# Patient Record
Sex: MALE | Race: BLACK OR AFRICAN AMERICAN | NOT HISPANIC OR LATINO | Employment: FULL TIME | URBAN - METROPOLITAN AREA
[De-identification: names, ages, dates, MRNs, and addresses within clinical notes are randomized per-mention and may not be internally consistent; named-entity substitution may affect disease eponyms.]

---

## 2019-10-30 ENCOUNTER — TELEPHONE (OUTPATIENT)
Dept: INTERNAL MEDICINE | Facility: CLINIC | Age: 54
End: 2019-10-30

## 2019-10-30 DIAGNOSIS — E11.9 TYPE 2 DIABETES MELLITUS WITHOUT COMPLICATION, WITH LONG-TERM CURRENT USE OF INSULIN (HCC): ICD-10-CM

## 2019-10-30 DIAGNOSIS — Z00.00 WELLNESS EXAMINATION: Primary | ICD-10-CM

## 2019-10-30 DIAGNOSIS — Z79.4 TYPE 2 DIABETES MELLITUS WITHOUT COMPLICATION, WITH LONG-TERM CURRENT USE OF INSULIN (HCC): ICD-10-CM

## 2019-11-05 LAB — HBA1C MFR BLD: 11.3 % (ref 4.8–5.6)

## 2019-11-06 LAB
ALBUMIN SERPL-MCNC: 4.6 G/DL (ref 3.5–5.2)
ALBUMIN/GLOB SERPL: 1.8 G/DL
ALP SERPL-CCNC: 88 U/L (ref 39–117)
ALT SERPL-CCNC: 27 U/L (ref 1–41)
AST SERPL-CCNC: 17 U/L (ref 1–40)
BILIRUB SERPL-MCNC: 0.5 MG/DL (ref 0.2–1.2)
BUN SERPL-MCNC: 12 MG/DL (ref 6–20)
BUN/CREAT SERPL: 12.9 (ref 7–25)
CALCIUM SERPL-MCNC: 9.1 MG/DL (ref 8.6–10.5)
CHLORIDE SERPL-SCNC: 100 MMOL/L (ref 98–107)
CHOLEST SERPL-MCNC: 206 MG/DL (ref 0–200)
CHOLEST/HDLC SERPL: 5.28 {RATIO}
CO2 SERPL-SCNC: 27.9 MMOL/L (ref 22–29)
CREAT SERPL-MCNC: 0.93 MG/DL (ref 0.76–1.27)
GLOBULIN SER CALC-MCNC: 2.5 GM/DL
GLUCOSE SERPL-MCNC: 289 MG/DL (ref 65–99)
HDLC SERPL-MCNC: 39 MG/DL (ref 40–60)
LDLC SERPL CALC-MCNC: 149 MG/DL (ref 0–100)
POTASSIUM SERPL-SCNC: 4.5 MMOL/L (ref 3.5–5.2)
PROT SERPL-MCNC: 7.1 G/DL (ref 6–8.5)
PSA SERPL-MCNC: 0.89 NG/ML (ref 0–4)
SODIUM SERPL-SCNC: 140 MMOL/L (ref 136–145)
TRIGL SERPL-MCNC: 88 MG/DL (ref 0–150)
VLDLC SERPL CALC-MCNC: 17.6 MG/DL

## 2019-11-11 ENCOUNTER — OFFICE VISIT (OUTPATIENT)
Dept: INTERNAL MEDICINE | Facility: CLINIC | Age: 54
End: 2019-11-11

## 2019-11-11 VITALS
WEIGHT: 179.2 LBS | BODY MASS INDEX: 24.27 KG/M2 | DIASTOLIC BLOOD PRESSURE: 78 MMHG | SYSTOLIC BLOOD PRESSURE: 118 MMHG | HEIGHT: 72 IN

## 2019-11-11 DIAGNOSIS — B35.3 TINEA PEDIS OF BOTH FEET: ICD-10-CM

## 2019-11-11 DIAGNOSIS — E11.9 TYPE 2 DIABETES MELLITUS WITHOUT COMPLICATION, WITHOUT LONG-TERM CURRENT USE OF INSULIN (HCC): ICD-10-CM

## 2019-11-11 DIAGNOSIS — Z00.00 WELLNESS EXAMINATION: Primary | ICD-10-CM

## 2019-11-11 DIAGNOSIS — R42 DIZZINESS: ICD-10-CM

## 2019-11-11 DIAGNOSIS — E78.5 HYPERLIPIDEMIA, UNSPECIFIED HYPERLIPIDEMIA TYPE: ICD-10-CM

## 2019-11-11 PROCEDURE — 99396 PREV VISIT EST AGE 40-64: CPT | Performed by: INTERNAL MEDICINE

## 2019-11-11 RX ORDER — DOXAZOSIN 8 MG/1
8 TABLET ORAL NIGHTLY
COMMUNITY

## 2019-11-11 RX ORDER — ATORVASTATIN CALCIUM 20 MG/1
20 TABLET, FILM COATED ORAL DAILY
Qty: 30 TABLET | Refills: 2 | Status: SHIPPED | OUTPATIENT
Start: 2019-11-11

## 2019-11-11 RX ORDER — FLASH GLUCOSE SCANNING READER
1 EACH MISCELLANEOUS CONTINUOUS
Qty: 1 DEVICE | Refills: 5 | Status: SHIPPED | OUTPATIENT
Start: 2019-11-11

## 2019-11-11 RX ORDER — FLASH GLUCOSE SENSOR
1 KIT MISCELLANEOUS CONTINUOUS
Qty: 1 EACH | Refills: 5 | Status: SHIPPED | OUTPATIENT
Start: 2019-11-11

## 2019-11-11 NOTE — PROGRESS NOTES
Subjective        Chief Complaint   Patient presents with   • Annual Exam     yearly exam review labs c/o flash in head with dizziness x1 month            Taiwo Bernal is a 54 y.o. male who presents for    Patient Active Problem List   Diagnosis   • Wellness examination   • Diabetes mellitus (CMS/HCC)   • Hyperlipidemia   • Tinea pedis of both feet       History of Present Illness     He will get a flash in his head for couple seconds at a time 3-5 times per day. He is not sure of the trigger. It is worse with laying down. He denies slurred speech, blindness or headaches. It has been going on for two months. He can be dizzy with it for 2 seconds when it occurs. He checks his BP and it was 120/85 two weeks ago. He does not check his sugars; he does have the equipment. He does not exercise. He denies chest pain, dyspnea, melena or hematochezia. He has no problem urinating when he takes Doxazosin. He does not have excessive thirst.  Allergies   Allergen Reactions   • Ace Inhibitors Cough       Current Outpatient Medications on File Prior to Visit   Medication Sig Dispense Refill   • doxazosin (CARDURA) 8 MG tablet Take 8 mg by mouth Every Night.       No current facility-administered medications on file prior to visit.        Past Medical History:   Diagnosis Date   • BPH (benign prostatic hyperplasia)    • Diabetes mellitus (CMS/HCC)    • Hyperlipidemia    • Hypertension        Past Surgical History:   Procedure Laterality Date   • COLONOSCOPY  03/18/2018       Family History   Problem Relation Age of Onset   • Breast cancer Mother    • Diabetes Father    • Lung cancer Father        Social History     Socioeconomic History   • Marital status:      Spouse name: Not on file   • Number of children: Not on file   • Years of education: Not on file   • Highest education level: Not on file   Tobacco Use   • Smoking status: Never Smoker   • Smokeless tobacco: Never Used   Substance and Sexual Activity   • Alcohol  "use: No     Frequency: Never   • Drug use: No   • Sexual activity: Yes     Comment: current partner for 5 years           The following portions of the patient's history were reviewed and updated as appropriate: problem list, allergies, current medications, past medical history, past family history, past social history and past surgical history.    Review of Systems   Constitutional: Negative for chills, fever and unexpected weight loss.   HENT: Negative for postnasal drip and sore throat.    Eyes: Negative for blurred vision.   Respiratory: Negative for cough, shortness of breath and wheezing.    Cardiovascular: Negative for chest pain and leg swelling.   Gastrointestinal: Negative for abdominal pain, blood in stool, nausea, vomiting and GERD.   Endocrine: Negative for polyuria.   Genitourinary: Negative for dysuria, frequency and hematuria.   Musculoskeletal: Negative for gait problem.   Skin: Negative for rash.   Allergic/Immunologic: Negative for immunocompromised state.   Neurological: Positive for dizziness. Negative for weakness.   Hematological: Does not bruise/bleed easily.   Psychiatric/Behavioral: Negative for depressed mood. The patient is not nervous/anxious.        Immunization History   Administered Date(s) Administered   • Flu Vaccine Quad PF >18YRS 10/08/2018, 10/05/2019   • Hepatitis A 01/01/1995   • Hepatitis B 02/14/2018, 03/13/2018, 09/10/2018   • Pneumococcal Polysaccharide (PPSV23) 01/01/2012   • Tdap 02/01/2018       Objective   Vitals:    11/11/19 0843   BP: 118/78   Weight: 81.3 kg (179 lb 3.2 oz)   Height: 182.9 cm (72\")     Physical Exam   Constitutional: He appears well-developed and well-nourished.   HENT:   Head: Normocephalic and atraumatic.   Mouth/Throat: Oropharynx is clear and moist.   Eyes: Conjunctivae and EOM are normal. Pupils are equal, round, and reactive to light.   Neck: Neck supple. Carotid bruit is not present. No thyromegaly present.   Cardiovascular: Normal rate, " regular rhythm and normal heart sounds.   No murmur heard.  Pulmonary/Chest: Effort normal and breath sounds normal.   Abdominal: Soft. He exhibits no distension and no mass. There is no tenderness. There is no rebound.    Taiwo had a diabetic foot exam performed today.   During the foot exam he had a monofilament test performed.  Vascular Status -  His right foot exhibits no edema. His left foot exhibits no edema.  Skin Integrity  -  His right foot skin is intact.His left foot skin is intact..  Lymphadenopathy:     He has no cervical adenopathy.   Neurological: He is alert.   CN II-XII intact except did not check VIII    Scaling on feet.   Skin: Skin is warm.   Psychiatric: He has a normal mood and affect.   Vitals reviewed.      Procedures    Assessment/Plan   Taiwo was seen today for annual exam.    Diagnoses and all orders for this visit:    Wellness examination    Type 2 diabetes mellitus without complication, without long-term current use of insulin (CMS/[facility])  -     sitaGLIPtin-metFORMIN (JANUMET)  MG per tablet; Take 1 tablet by mouth 2 (Two) Times a Day With Meals.  -     Hemoglobin A1c; Future  -     Microalbumin / Creatinine Urine Ratio - Urine, Clean Catch; Future  -     C-Peptide; Future  -     Continuous Blood Gluc Sensor (FREESTYLE JULIO 14 DAY SENSOR) misc; 1 Device Continuous.  -     Continuous Blood Gluc  (FREESTYLE JULIO 14 DAY READER) device; 1 Device Continuous.    Hyperlipidemia, unspecified hyperlipidemia type  -     atorvastatin (LIPITOR) 20 MG tablet; Take 1 tablet by mouth Daily.  -     Comprehensive Metabolic Panel; Future  -     Lipid Panel With / Chol / HDL Ratio; Future    Tinea pedis of both feet    Dizziness  -     MRI Angiogram Head Without Contrast; Future               Reviewed labs. Sign for old records. Get cscope from [facility]. Discussed risk of blindness, MI, CVA, amputation, and ESRD with untreated DM. [facility] exercise. Gets prostate exam with Dr. Joshi. [facility]  OTC meds for tinea pedis. He wants to switch to Felicia to check sugars. I am going to get an MRA to make sure he does not have an aneurysm with the sensation that he has in his head.  Return in about 3 months (around 2/11/2020).

## 2020-02-04 DIAGNOSIS — E78.5 HYPERLIPIDEMIA, UNSPECIFIED HYPERLIPIDEMIA TYPE: ICD-10-CM

## 2020-02-04 DIAGNOSIS — E11.9 TYPE 2 DIABETES MELLITUS WITHOUT COMPLICATION, WITHOUT LONG-TERM CURRENT USE OF INSULIN (HCC): ICD-10-CM

## 2023-04-17 ENCOUNTER — OFFICE VISIT (OUTPATIENT)
Dept: INTERNAL MEDICINE | Facility: CLINIC | Age: 58
End: 2023-04-17
Payer: OTHER GOVERNMENT

## 2023-04-17 VITALS
WEIGHT: 171 LBS | BODY MASS INDEX: 23.16 KG/M2 | TEMPERATURE: 97.1 F | SYSTOLIC BLOOD PRESSURE: 140 MMHG | DIASTOLIC BLOOD PRESSURE: 80 MMHG | HEIGHT: 72 IN

## 2023-04-17 DIAGNOSIS — D36.9 TUBULAR ADENOMA: ICD-10-CM

## 2023-04-17 DIAGNOSIS — Z11.59 NEED FOR HEPATITIS C SCREENING TEST: ICD-10-CM

## 2023-04-17 DIAGNOSIS — Z00.00 WELLNESS EXAMINATION: ICD-10-CM

## 2023-04-17 DIAGNOSIS — E78.49 OTHER HYPERLIPIDEMIA: Chronic | ICD-10-CM

## 2023-04-17 DIAGNOSIS — E11.9 TYPE 2 DIABETES MELLITUS WITHOUT COMPLICATION, WITHOUT LONG-TERM CURRENT USE OF INSULIN: Primary | Chronic | ICD-10-CM

## 2023-04-17 NOTE — PROGRESS NOTES
Subjective        Chief Complaint   Patient presents with   • Diabetes   • Hypertension   • Annual Exam           Taiwo Bernal is a 57 y.o. male who presents for    Patient Active Problem List   Diagnosis   • Other hyperlipidemia   • Type 2 diabetes mellitus without complication, without long-term current use of insulin   • Tubular adenoma       History of Present Illness     He sees urology once per year for his prostate. He has not checked his BP or sugars. He denies urinary frequency, dysuria, abdominal pain, chest pain or dyspnea. He has lost 11 pounds over the last 2 years and that is why he came in. He walks 4-5 miles per day at work. He plays basketball once per week. He has been monomagous.  Allergies   Allergen Reactions   • Ace Inhibitors Cough       Current Outpatient Medications on File Prior to Visit   Medication Sig Dispense Refill   • doxazosin (CARDURA) 8 MG tablet Take 1 tablet by mouth Every Night.     • atorvastatin (LIPITOR) 20 MG tablet Take 1 tablet by mouth Daily. 30 tablet 2   • sitaGLIPtin-metFORMIN (JANUMET)  MG per tablet Take 1 tablet by mouth 2 (Two) Times a Day With Meals. 60 tablet 2   • [DISCONTINUED] Continuous Blood Gluc  (FREESTYLE JULIO 14 DAY READER) device 1 Device Continuous. (Patient not taking: Reported on 4/17/2023) 1 Device 5   • [DISCONTINUED] Continuous Blood Gluc Sensor (FREESTYLE JULIO 14 DAY SENSOR) misc 1 Device Continuous. (Patient not taking: Reported on 4/17/2023) 1 each 5     No current facility-administered medications on file prior to visit.       Past Medical History:   Diagnosis Date   • BPH (benign prostatic hyperplasia)        Past Surgical History:   Procedure Laterality Date   • COLONOSCOPY  03/27/2018    repeat in 5 years with Dr. Sexton       Family History   Problem Relation Age of Onset   • Breast cancer Mother    • Diabetes Father    • Lung cancer Father    • Colon polyps Brother        Social History     Socioeconomic History   •  "Marital status:    Tobacco Use   • Smoking status: Never   • Smokeless tobacco: Never   Substance and Sexual Activity   • Alcohol use: No   • Drug use: No   • Sexual activity: Yes     Comment: current partner for 5 years           The following portions of the patient's history were reviewed and updated as appropriate: problem list, allergies, current medications, past medical history, past family history, past social history and past surgical history.    Review of Systems   Constitutional: Positive for unexpected weight loss. Negative for chills and fever.   HENT: Negative for postnasal drip and sore throat.    Eyes: Negative for blurred vision.   Respiratory: Negative for cough, shortness of breath and wheezing.    Cardiovascular: Negative for chest pain and leg swelling.   Gastrointestinal: Negative for abdominal pain, blood in stool, nausea, vomiting and GERD.   Endocrine: Negative for polyuria.   Genitourinary: Negative for dysuria, frequency and hematuria.   Musculoskeletal: Negative for gait problem.   Skin: Negative for rash.   Allergic/Immunologic: Negative for immunocompromised state.   Neurological: Negative for weakness.   Hematological: Does not bruise/bleed easily.   Psychiatric/Behavioral: Negative for depressed mood. The patient is not nervous/anxious.        Immunization History   Administered Date(s) Administered   • COVID-19 (PFIZER) PURPLE CAP 03/11/2021, 04/08/2021, 10/31/2021   • Fluzone Quad >6mos (Multi-dose) 10/08/2018, 10/05/2019   • Hepatitis A 01/01/1995   • Hepatitis B Adult/Adolescent IM 02/14/2018, 03/13/2018, 09/10/2018   • Influenza, Unspecified 12/21/2022   • Pneumococcal Conjugate 20-Valent (PCV20) 04/17/2023   • Pneumococcal Polysaccharide (PPSV23) 01/01/2012   • Tdap 02/01/2018       Objective   Vitals:    04/17/23 1532   BP: 140/80   Temp: 97.1 °F (36.2 °C)   Weight: 77.6 kg (171 lb)   Height: 182.9 cm (72.01\")     Body mass index is 23.19 kg/m².  Physical Exam  Vitals " reviewed.   Constitutional:       Appearance: He is well-developed.   HENT:      Head: Normocephalic and atraumatic.      Mouth/Throat:      Mouth: Mucous membranes are moist.      Pharynx: Oropharynx is clear.   Eyes:      Extraocular Movements: Extraocular movements intact.      Conjunctiva/sclera: Conjunctivae normal.      Pupils: Pupils are equal, round, and reactive to light.   Neck:      Thyroid: No thyromegaly.      Vascular: No carotid bruit.   Cardiovascular:      Rate and Rhythm: Normal rate and regular rhythm.      Heart sounds: Normal heart sounds. No murmur heard.  Pulmonary:      Effort: Pulmonary effort is normal.      Breath sounds: Normal breath sounds.   Abdominal:      General: There is no distension.      Palpations: Abdomen is soft. There is no mass.      Tenderness: There is no abdominal tenderness. There is no rebound.   Musculoskeletal:      Cervical back: Neck supple.   Feet:      Right foot:      Protective Sensation: 5 sites tested. 5 sites sensed.      Skin integrity: Skin integrity normal.      Left foot:      Protective Sensation: 5 sites tested. 5 sites sensed.      Skin integrity: Skin integrity normal.   Lymphadenopathy:      Cervical: No cervical adenopathy.      Upper Body:      Right upper body: No supraclavicular, axillary or epitrochlear adenopathy.      Left upper body: No supraclavicular, axillary or epitrochlear adenopathy.   Skin:     General: Skin is warm.   Neurological:      Mental Status: He is alert.   Psychiatric:         Behavior: Behavior normal.         Procedures    Assessment & Plan   Diagnoses and all orders for this visit:    1. Type 2 diabetes mellitus without complication, without long-term current use of insulin (Primary)  -     Comprehensive Metabolic Panel  -     Hemoglobin A1c  -     Microalbumin / Creatinine Urine Ratio - Urine, Clean Catch  -     CBC & Differential  -     TSH  -     Urinalysis With Culture If Indicated -  -     Hemoglobin A1c;  Future    2. Other hyperlipidemia  -     Lipid Panel With / Chol / HDL Ratio  -     Comprehensive Metabolic Panel; Future  -     Lipid Panel With / Chol / HDL Ratio; Future    3. Need for hepatitis C screening test  -     Hepatitis C Antibody    4. Tubular adenoma  -     Ambulatory Referral For Screening Colonoscopy    5. Wellness examination    Other orders  -     Pneumococcal Conjugate Vaccine 20-Valent (PCV20)               Get labs. Discussed risks of uncontrolled DM. He will get me a copy of his eye exam. Repeat BP in follow up. Recc Shingrix. Recc 150 minutes of cardio per week. I think he most likely has lost wt secondary to uncontrolled DM; labs as above.  Return in about 3 months (around 7/17/2023).

## 2023-04-20 DIAGNOSIS — E78.5 HYPERLIPIDEMIA, UNSPECIFIED HYPERLIPIDEMIA TYPE: ICD-10-CM

## 2023-04-20 DIAGNOSIS — E11.9 TYPE 2 DIABETES MELLITUS WITHOUT COMPLICATION, WITHOUT LONG-TERM CURRENT USE OF INSULIN: ICD-10-CM

## 2023-04-20 LAB
ALBUMIN SERPL-MCNC: 4.3 G/DL (ref 3.5–5.2)
ALBUMIN/CREAT UR: 17 MG/G CREAT (ref 0–29)
ALBUMIN/GLOB SERPL: 1.7 G/DL
ALP SERPL-CCNC: 111 U/L (ref 39–117)
ALT SERPL-CCNC: 17 U/L (ref 1–41)
APPEARANCE UR: CLEAR
AST SERPL-CCNC: 16 U/L (ref 1–40)
BACTERIA #/AREA URNS HPF: NORMAL /HPF
BASOPHILS # BLD AUTO: 0.06 10*3/MM3 (ref 0–0.2)
BASOPHILS NFR BLD AUTO: 1.2 % (ref 0–1.5)
BILIRUB SERPL-MCNC: 0.4 MG/DL (ref 0–1.2)
BILIRUB UR QL STRIP: NEGATIVE
BUN SERPL-MCNC: 14 MG/DL (ref 6–20)
BUN/CREAT SERPL: 13.9 (ref 7–25)
CALCIUM SERPL-MCNC: 9.3 MG/DL (ref 8.6–10.5)
CASTS URNS QL MICRO: NORMAL /LPF
CHLORIDE SERPL-SCNC: 100 MMOL/L (ref 98–107)
CHOLEST SERPL-MCNC: 213 MG/DL (ref 0–200)
CHOLEST/HDLC SERPL: 4.95 {RATIO}
CO2 SERPL-SCNC: 26.9 MMOL/L (ref 22–29)
COLOR UR: YELLOW
CREAT SERPL-MCNC: 1.01 MG/DL (ref 0.76–1.27)
CREAT UR-MCNC: 137.7 MG/DL
EGFRCR SERPLBLD CKD-EPI 2021: 86.2 ML/MIN/1.73
EOSINOPHIL # BLD AUTO: 0.06 10*3/MM3 (ref 0–0.4)
EOSINOPHIL NFR BLD AUTO: 1.2 % (ref 0.3–6.2)
EPI CELLS #/AREA URNS HPF: NORMAL /HPF (ref 0–10)
ERYTHROCYTE [DISTWIDTH] IN BLOOD BY AUTOMATED COUNT: 12.6 % (ref 12.3–15.4)
GLOBULIN SER CALC-MCNC: 2.6 GM/DL
GLUCOSE SERPL-MCNC: 376 MG/DL (ref 65–99)
GLUCOSE UR QL STRIP: ABNORMAL
HBA1C MFR BLD: 12.5 % (ref 4.8–5.6)
HCT VFR BLD AUTO: 39.9 % (ref 37.5–51)
HCV IGG SERPL QL IA: NON REACTIVE
HDLC SERPL-MCNC: 43 MG/DL (ref 40–60)
HGB BLD-MCNC: 13.7 G/DL (ref 13–17.7)
HGB UR QL STRIP: NEGATIVE
IMM GRANULOCYTES # BLD AUTO: 0.01 10*3/MM3 (ref 0–0.05)
IMM GRANULOCYTES NFR BLD AUTO: 0.2 % (ref 0–0.5)
KETONES UR QL STRIP: NEGATIVE
LDLC SERPL CALC-MCNC: 155 MG/DL (ref 0–100)
LEUKOCYTE ESTERASE UR QL STRIP: NEGATIVE
LYMPHOCYTES # BLD AUTO: 1.46 10*3/MM3 (ref 0.7–3.1)
LYMPHOCYTES NFR BLD AUTO: 30.1 % (ref 19.6–45.3)
MCH RBC QN AUTO: 30 PG (ref 26.6–33)
MCHC RBC AUTO-ENTMCNC: 34.3 G/DL (ref 31.5–35.7)
MCV RBC AUTO: 87.3 FL (ref 79–97)
MICRO URNS: ABNORMAL
MICRO URNS: ABNORMAL
MICROALBUMIN UR-MCNC: 23.5 UG/ML
MONOCYTES # BLD AUTO: 0.34 10*3/MM3 (ref 0.1–0.9)
MONOCYTES NFR BLD AUTO: 7 % (ref 5–12)
NEUTROPHILS # BLD AUTO: 2.92 10*3/MM3 (ref 1.7–7)
NEUTROPHILS NFR BLD AUTO: 60.3 % (ref 42.7–76)
NITRITE UR QL STRIP: NEGATIVE
NRBC BLD AUTO-RTO: 0 /100 WBC (ref 0–0.2)
PH UR STRIP: 5.5 [PH] (ref 5–7.5)
PLATELET # BLD AUTO: 161 10*3/MM3 (ref 140–450)
POTASSIUM SERPL-SCNC: 4.2 MMOL/L (ref 3.5–5.2)
PROT SERPL-MCNC: 6.9 G/DL (ref 6–8.5)
PROT UR QL STRIP: NEGATIVE
RBC # BLD AUTO: 4.57 10*6/MM3 (ref 4.14–5.8)
RBC #/AREA URNS HPF: NORMAL /HPF (ref 0–2)
SODIUM SERPL-SCNC: 139 MMOL/L (ref 136–145)
SP GR UR STRIP: >=1.03 (ref 1–1.03)
TRIGL SERPL-MCNC: 86 MG/DL (ref 0–150)
TSH SERPL DL<=0.005 MIU/L-ACNC: 1.12 UIU/ML (ref 0.27–4.2)
URINALYSIS REFLEX: ABNORMAL
UROBILINOGEN UR STRIP-MCNC: 1 MG/DL (ref 0.2–1)
VLDLC SERPL CALC-MCNC: 15 MG/DL (ref 5–40)
WBC # BLD AUTO: 4.85 10*3/MM3 (ref 3.4–10.8)
WBC #/AREA URNS HPF: NORMAL /HPF (ref 0–5)

## 2023-04-20 RX ORDER — SITAGLIPTIN AND METFORMIN HYDROCHLORIDE 1000; 50 MG/1; MG/1
1 TABLET, FILM COATED ORAL 2 TIMES DAILY WITH MEALS
Qty: 60 TABLET | Refills: 2 | Status: SHIPPED | OUTPATIENT
Start: 2023-04-20

## 2023-04-20 RX ORDER — ATORVASTATIN CALCIUM 20 MG/1
20 TABLET, FILM COATED ORAL DAILY
Qty: 30 TABLET | Refills: 2 | Status: SHIPPED | OUTPATIENT
Start: 2023-04-20

## 2023-05-16 ENCOUNTER — TELEPHONE (OUTPATIENT)
Dept: INTERNAL MEDICINE | Facility: CLINIC | Age: 58
End: 2023-05-16
Payer: OTHER GOVERNMENT

## 2023-05-16 NOTE — TELEPHONE ENCOUNTER
PATIENT CALLED IN REGARD TO REFERRAL FOR COLONOSCOPY, HE HAS NOT HEARD ANYTHING ABOUT AN APPOINTMENT.     PLEASE CALL AND ADVISE [phone]

## 2023-05-17 ENCOUNTER — TELEPHONE (OUTPATIENT)
Dept: GASTROENTEROLOGY | Facility: CLINIC | Age: 58
End: 2023-05-17
Payer: OTHER GOVERNMENT

## 2023-05-25 ENCOUNTER — PREP FOR SURGERY (OUTPATIENT)
Dept: SURGERY | Facility: SURGERY CENTER | Age: 58
End: 2023-05-25

## 2023-05-25 DIAGNOSIS — Z12.11 ENCOUNTER FOR SCREENING FOR MALIGNANT NEOPLASM OF COLON: Primary | ICD-10-CM

## 2023-05-25 DIAGNOSIS — Z86.010 HISTORY OF COLON POLYPS: ICD-10-CM

## 2023-05-25 RX ORDER — SODIUM CHLORIDE, SODIUM LACTATE, POTASSIUM CHLORIDE, CALCIUM CHLORIDE 600; 310; 30; 20 MG/100ML; MG/100ML; MG/100ML; MG/100ML
30 INJECTION, SOLUTION INTRAVENOUS CONTINUOUS PRN
OUTPATIENT
Start: 2023-05-25

## 2023-08-17 DIAGNOSIS — E78.5 HYPERLIPIDEMIA, UNSPECIFIED HYPERLIPIDEMIA TYPE: ICD-10-CM

## 2023-08-17 RX ORDER — ATORVASTATIN CALCIUM 20 MG/1
20 TABLET, FILM COATED ORAL DAILY
Qty: 30 TABLET | Refills: 2 | Status: SHIPPED | OUTPATIENT
Start: 2023-08-17

## 2023-10-20 ENCOUNTER — OFFICE VISIT (OUTPATIENT)
Dept: INTERNAL MEDICINE | Facility: CLINIC | Age: 58
End: 2023-10-20
Payer: OTHER GOVERNMENT

## 2023-10-20 VITALS
SYSTOLIC BLOOD PRESSURE: 130 MMHG | HEIGHT: 72 IN | DIASTOLIC BLOOD PRESSURE: 72 MMHG | WEIGHT: 166.8 LBS | TEMPERATURE: 97.5 F | BODY MASS INDEX: 22.59 KG/M2

## 2023-10-20 DIAGNOSIS — E78.49 OTHER HYPERLIPIDEMIA: ICD-10-CM

## 2023-10-20 DIAGNOSIS — E11.9 TYPE 2 DIABETES MELLITUS WITHOUT COMPLICATION, WITHOUT LONG-TERM CURRENT USE OF INSULIN: Chronic | ICD-10-CM

## 2023-10-20 DIAGNOSIS — Z23 NEED FOR INFLUENZA VACCINATION: ICD-10-CM

## 2023-10-20 DIAGNOSIS — D36.9 TUBULAR ADENOMA: Primary | ICD-10-CM

## 2023-10-20 DIAGNOSIS — M25.512 ACUTE PAIN OF LEFT SHOULDER: ICD-10-CM

## 2023-10-20 RX ORDER — ATORVASTATIN CALCIUM 40 MG/1
40 TABLET, FILM COATED ORAL DAILY
Qty: 90 TABLET | Refills: 0 | Status: SHIPPED | OUTPATIENT
Start: 2023-10-20

## 2023-10-20 RX ORDER — BLOOD-GLUCOSE SENSOR
1 EACH MISCELLANEOUS
Qty: 2 EACH | Refills: 5 | Status: SHIPPED | OUTPATIENT
Start: 2023-10-20

## 2023-10-20 NOTE — PROGRESS NOTES
Subjective        Chief Complaint   Patient presents with    Diabetes           Taiwo Bernal is a 58 y.o. male who presents for    Patient Active Problem List   Diagnosis    Other hyperlipidemia    Type 2 diabetes mellitus without complication, without long-term current use of insulin    Tubular adenoma       History of Present Illness       He thinks he pulled a muscle in his left shoulder lifting wts after last OV. It hurts with throwing a football or lifting his weights. He only took samples of his DM meds. He is not taking januvia or jardiance. He is not checking his sugars.   Allergies   Allergen Reactions    Ace Inhibitors Cough       Current Outpatient Medications on File Prior to Visit   Medication Sig Dispense Refill    doxazosin (CARDURA) 8 MG tablet Take 1 tablet by mouth Every Night.      [DISCONTINUED] atorvastatin (LIPITOR) 20 MG tablet Take 1 tablet by mouth Daily. 30 tablet 2    [DISCONTINUED] Continuous Blood Gluc Sensor (FreeStyle Felicia 3 Sensor) misc 1 Device Every 14 (Fourteen) Days. 2 each 5    [DISCONTINUED] empagliflozin (Jardiance) 10 MG tablet tablet Take 1 tablet by mouth Daily. 30 tablet 2    [DISCONTINUED] SITagliptin (Januvia) 100 MG tablet Take 1 tablet by mouth Daily. 30 tablet 2     No current facility-administered medications on file prior to visit.       Past Medical History:   Diagnosis Date    BPH (benign prostatic hyperplasia)        Past Surgical History:   Procedure Laterality Date    COLONOSCOPY  03/27/2018    repeat in 5 years with Dr. Sexton       Family History   Problem Relation Age of Onset    Breast cancer Mother     Diabetes Father     Lung cancer Father     Colon polyps Brother        Social History     Socioeconomic History    Marital status:    Tobacco Use    Smoking status: Never    Smokeless tobacco: Never   Vaping Use    Vaping Use: Never used   Substance and Sexual Activity    Alcohol use: No    Drug use: No    Sexual activity: Yes     Comment: current  "partner for 5 years           The following portions of the patient's history were reviewed and updated as appropriate: problem list, allergies, current medications, past medical history, past family history, past social history, and past surgical history.    Review of Systems    Immunization History   Administered Date(s) Administered    COVID-19 (PFIZER) Purple Cap Monovalent 03/11/2021, 04/08/2021, 10/31/2021    Fluzone Quad >6mos (Multi-dose) 10/08/2018, 10/05/2019    Hepatitis A 01/01/1995    Hepatitis B Adult/Adolescent IM 02/14/2018, 03/13/2018, 09/10/2018    Influenza Injectable Mdck Pf Quad 12/21/2022    Influenza, Unspecified 12/21/2022    Pneumococcal Conjugate 20-Valent (PCV20) 04/17/2023    Pneumococcal Polysaccharide (PPSV23) 01/01/2012    Tdap 02/01/2018       Objective   Vitals:    10/20/23 0918   BP: 130/72   Temp: 97.5 °F (36.4 °C)   Weight: 75.7 kg (166 lb 12.8 oz)   Height: 183 cm (72.03\")     Body mass index is 22.6 kg/m².  Physical Exam  Vitals reviewed.   Constitutional:       Appearance: He is well-developed.   HENT:      Head: Normocephalic and atraumatic.   Cardiovascular:      Rate and Rhythm: Normal rate and regular rhythm.      Heart sounds: Normal heart sounds, S1 normal and S2 normal.   Pulmonary:      Effort: Pulmonary effort is normal.      Breath sounds: Normal breath sounds.   Musculoskeletal:      Comments: Left shoulder FROM and non tender. Crepitus is present   Skin:     General: Skin is warm.   Neurological:      Mental Status: He is alert.   Psychiatric:         Behavior: Behavior normal.         Procedures    Assessment & Plan   Diagnoses and all orders for this visit:    1. Tubular adenoma (Primary)  -     Ambulatory Referral For Screening Colonoscopy    2. Type 2 diabetes mellitus without complication, without long-term current use of insulin  Comments:  Start Januvia and Jardiance; discussed risk UTI and yeast.  He is agreeable to freestyle dali.  Orders:  -     " empagliflozin (Jardiance) 10 MG tablet tablet; Take 1 tablet by mouth Daily.  Dispense: 90 tablet; Refill: 0  -     SITagliptin (Januvia) 100 MG tablet; Take 1 tablet by mouth Daily.  Dispense: 90 tablet; Refill: 0  -     Continuous Blood Gluc Sensor (FreeStyle Felicia 3 Sensor) misc; Use 1 Device Every 14 (Fourteen) Days.  Dispense: 2 each; Refill: 5    3. Other hyperlipidemia  Comments:  increase lipitor  Orders:  -     atorvastatin (Lipitor) 40 MG tablet; Take 1 tablet by mouth Daily.  Dispense: 90 tablet; Refill: 0    4. Acute pain of left shoulder  -     XR Shoulder 2+ View Left; Future  -     Ambulatory Referral to Physical Therapy Evaluate and treat               Reviewed cmp, flp and A1c. Start meds as above. Flu shot today. He will get COVID and Shingrix at drug store.    Return in about 4 weeks (around 11/17/2023).

## 2024-01-04 ENCOUNTER — TELEPHONE (OUTPATIENT)
Dept: GASTROENTEROLOGY | Facility: CLINIC | Age: 59
End: 2024-01-04
Payer: OTHER GOVERNMENT

## 2024-01-04 NOTE — TELEPHONE ENCOUNTER
Caller: Taiwo Bernal    Relationship: Self    Best call back number: [phone]    What is the best time to reach you: ANYTIME    Who are you requesting to speak with (clinical staff, provider,  specific staff member): FRONT        What was the call regarding: SCHEDULE HIS COLONOSCOPY FROM REFERRAL

## 2024-01-09 ENCOUNTER — PREP FOR SURGERY (OUTPATIENT)
Dept: SURGERY | Facility: SURGERY CENTER | Age: 59
End: 2024-01-09
Payer: OTHER GOVERNMENT

## 2024-01-09 DIAGNOSIS — Z12.11 ENCOUNTER FOR SCREENING FOR MALIGNANT NEOPLASM OF COLON: Primary | ICD-10-CM

## 2024-01-09 RX ORDER — SODIUM CHLORIDE 0.9 % (FLUSH) 0.9 %
10 SYRINGE (ML) INJECTION AS NEEDED
OUTPATIENT
Start: 2024-01-09

## 2024-01-09 RX ORDER — SODIUM CHLORIDE 0.9 % (FLUSH) 0.9 %
3 SYRINGE (ML) INJECTION EVERY 12 HOURS SCHEDULED
OUTPATIENT
Start: 2024-01-09

## 2024-01-09 RX ORDER — SODIUM CHLORIDE, SODIUM LACTATE, POTASSIUM CHLORIDE, CALCIUM CHLORIDE 600; 310; 30; 20 MG/100ML; MG/100ML; MG/100ML; MG/100ML
30 INJECTION, SOLUTION INTRAVENOUS CONTINUOUS PRN
OUTPATIENT
Start: 2024-01-09

## 2024-01-12 ENCOUNTER — OUTSIDE FACILITY SERVICE (OUTPATIENT)
Dept: GASTROENTEROLOGY | Facility: CLINIC | Age: 59
End: 2024-01-12
Payer: OTHER GOVERNMENT

## 2024-01-12 PROCEDURE — 45378 DIAGNOSTIC COLONOSCOPY: CPT | Performed by: INTERNAL MEDICINE

## 2024-01-20 DIAGNOSIS — E78.49 OTHER HYPERLIPIDEMIA: ICD-10-CM

## 2024-01-22 RX ORDER — ATORVASTATIN CALCIUM 40 MG/1
40 TABLET, FILM COATED ORAL DAILY
Qty: 90 TABLET | Refills: 0 | OUTPATIENT
Start: 2024-01-22

## 2024-06-15 DIAGNOSIS — E78.49 OTHER HYPERLIPIDEMIA: ICD-10-CM

## 2024-06-17 RX ORDER — ATORVASTATIN CALCIUM 40 MG/1
40 TABLET, FILM COATED ORAL DAILY
Qty: 90 TABLET | Refills: 0 | OUTPATIENT
Start: 2024-06-17

## 2024-07-03 ENCOUNTER — TELEPHONE (OUTPATIENT)
Dept: INTERNAL MEDICINE | Facility: CLINIC | Age: 59
End: 2024-07-03
Payer: OTHER GOVERNMENT

## 2024-07-03 NOTE — TELEPHONE ENCOUNTER
Caller: Taiwo Bernal    Relationship: Self    Best call back number: [phone]     What is the best time to reach you: ANYTIME    Who are you requesting to speak with (clinical staff, provider,  specific staff member): CLINICAL    What was the call regarding: PATIENT CALLING WANTING TO KNOW IF HE NEEDS LABS PRIOR TO HIS VISIT    Is it okay if the provider responds through MyChart: NO

## 2024-07-04 DIAGNOSIS — E11.9 TYPE 2 DIABETES MELLITUS WITHOUT COMPLICATION, WITHOUT LONG-TERM CURRENT USE OF INSULIN: Primary | ICD-10-CM

## 2024-07-05 ENCOUNTER — LAB (OUTPATIENT)
Facility: HOSPITAL | Age: 59
End: 2024-07-05
Payer: OTHER GOVERNMENT

## 2024-07-05 LAB
ALBUMIN SERPL-MCNC: 4.2 G/DL (ref 3.5–5.2)
ALBUMIN UR-MCNC: 1.8 MG/DL
ALBUMIN/GLOB SERPL: 1.6 G/DL
ALP SERPL-CCNC: 117 U/L (ref 39–117)
ALT SERPL W P-5'-P-CCNC: 26 U/L (ref 1–41)
ANION GAP SERPL CALCULATED.3IONS-SCNC: 13 MMOL/L (ref 5–15)
AST SERPL-CCNC: 18 U/L (ref 1–40)
BILIRUB SERPL-MCNC: 0.3 MG/DL (ref 0–1.2)
BUN SERPL-MCNC: 14 MG/DL (ref 6–20)
BUN/CREAT SERPL: 11.7 (ref 7–25)
CALCIUM SPEC-SCNC: 9.8 MG/DL (ref 8.6–10.5)
CHLORIDE SERPL-SCNC: 99 MMOL/L (ref 98–107)
CHOLEST SERPL-MCNC: 195 MG/DL (ref 0–200)
CO2 SERPL-SCNC: 27 MMOL/L (ref 22–29)
CREAT SERPL-MCNC: 1.2 MG/DL (ref 0.76–1.27)
CREAT UR-MCNC: 121.1 MG/DL
EGFRCR SERPLBLD CKD-EPI 2021: 69.7 ML/MIN/1.73
GLOBULIN UR ELPH-MCNC: 2.7 GM/DL
GLUCOSE SERPL-MCNC: 378 MG/DL (ref 65–99)
HBA1C MFR BLD: 11.6 % (ref 4.8–5.6)
HDLC SERPL QL: 4.64
HDLC SERPL-MCNC: 42 MG/DL (ref 40–60)
LDLC SERPL CALC-MCNC: 135 MG/DL (ref 0–100)
MICROALBUMIN/CREAT UR: 14.9 MG/G (ref 0–29)
POTASSIUM SERPL-SCNC: 4.3 MMOL/L (ref 3.5–5.2)
PROT SERPL-MCNC: 6.9 G/DL (ref 6–8.5)
SODIUM SERPL-SCNC: 139 MMOL/L (ref 136–145)
TRIGL SERPL-MCNC: 99 MG/DL (ref 0–150)
VLDLC SERPL-MCNC: 18 MG/DL (ref 5–40)

## 2024-07-05 PROCEDURE — 80061 LIPID PANEL: CPT | Performed by: INTERNAL MEDICINE

## 2024-07-05 PROCEDURE — 82043 UR ALBUMIN QUANTITATIVE: CPT | Performed by: INTERNAL MEDICINE

## 2024-07-05 PROCEDURE — 82570 ASSAY OF URINE CREATININE: CPT | Performed by: INTERNAL MEDICINE

## 2024-07-05 PROCEDURE — 83036 HEMOGLOBIN GLYCOSYLATED A1C: CPT | Performed by: INTERNAL MEDICINE

## 2024-07-05 PROCEDURE — 36415 COLL VENOUS BLD VENIPUNCTURE: CPT | Performed by: INTERNAL MEDICINE

## 2024-07-05 PROCEDURE — 80053 COMPREHEN METABOLIC PANEL: CPT | Performed by: INTERNAL MEDICINE

## 2024-07-09 ENCOUNTER — OFFICE VISIT (OUTPATIENT)
Dept: INTERNAL MEDICINE | Facility: CLINIC | Age: 59
End: 2024-07-09
Payer: OTHER GOVERNMENT

## 2024-07-09 VITALS
DIASTOLIC BLOOD PRESSURE: 90 MMHG | SYSTOLIC BLOOD PRESSURE: 140 MMHG | HEIGHT: 72 IN | BODY MASS INDEX: 23.92 KG/M2 | WEIGHT: 176.6 LBS

## 2024-07-09 DIAGNOSIS — Z00.00 WELLNESS EXAMINATION: Primary | ICD-10-CM

## 2024-07-09 DIAGNOSIS — E78.00 HIGH CHOLESTEROL: ICD-10-CM

## 2024-07-09 DIAGNOSIS — E11.9 TYPE 2 DIABETES MELLITUS WITHOUT COMPLICATION, WITHOUT LONG-TERM CURRENT USE OF INSULIN: Chronic | ICD-10-CM

## 2024-07-09 PROCEDURE — 99396 PREV VISIT EST AGE 40-64: CPT | Performed by: INTERNAL MEDICINE

## 2024-07-09 RX ORDER — ATORVASTATIN CALCIUM 40 MG/1
40 TABLET, FILM COATED ORAL DAILY
Qty: 90 TABLET | Refills: 1 | Status: SHIPPED | OUTPATIENT
Start: 2024-07-09

## 2024-07-09 RX ORDER — BLOOD-GLUCOSE SENSOR
1 EACH MISCELLANEOUS
Qty: 2 EACH | Refills: 5 | Status: SHIPPED | OUTPATIENT
Start: 2024-07-09

## 2024-07-09 NOTE — PROGRESS NOTES
Subjective        Chief Complaint   Patient presents with    Annual Exam           Taiwo Bernal is a 58 y.o. male who presents for    Patient Active Problem List   Diagnosis    High cholesterol    Type 2 diabetes mellitus without complication, without long-term current use of insulin    Tubular adenoma       History of Present Illness     Denies chest pain, dyspnea, melena, or hematochezia. He does not check his sugars. He checks his BP every 10 days and it avg 121/77. He has not taking his DM meds. He exercises 4 days per week. He does not eat sweets. He was told over a year ago that he needed by optometrist that he needed to see eye doctor as he may have diabetes affecting his eyes.    Allergies   Allergen Reactions    Ace Inhibitors Cough       Current Outpatient Medications on File Prior to Visit   Medication Sig Dispense Refill    doxazosin (CARDURA) 8 MG tablet Take 1 tablet by mouth Every Night.      [DISCONTINUED] atorvastatin (Lipitor) 40 MG tablet Take 1 tablet by mouth Daily. 90 tablet 0    [DISCONTINUED] Continuous Blood Gluc Sensor (FreeStyle Felicia 3 Sensor) misc Use 1 Device Every 14 (Fourteen) Days. 2 each 5    [DISCONTINUED] COVID-19 mRNA Vac-Carol,Pfizer, (Comirnaty) 30 MCG/0.3ML suspension Inject  into the appropriate muscle as directed by prescriber. 0.3 mL 0    [DISCONTINUED] empagliflozin (Jardiance) 10 MG tablet tablet Take 1 tablet by mouth Daily. 90 tablet 0    [DISCONTINUED] Zoster Vac Recomb Adjuvanted (Shingrix) 50 MCG/0.5ML reconstituted suspension Inject  into the appropriate muscle as directed by prescriber. 0.5 mL 0    [DISCONTINUED] SITagliptin (Januvia) 100 MG tablet Take 1 tablet by mouth Daily. (Patient not taking: Reported on 7/9/2024) 90 tablet 0     No current facility-administered medications on file prior to visit.       Past Medical History:   Diagnosis Date    BPH (benign prostatic hyperplasia)        Past Surgical History:   Procedure Laterality Date    COLONOSCOPY   "03/27/2018    repeat in 5 years with Dr. Sexton    COLONOSCOPY  01/12/2024    Dr. Sexton       Family History   Problem Relation Age of Onset    Breast cancer Mother     Diabetes Father     Lung cancer Father     Colon polyps Brother        Social History     Socioeconomic History    Marital status:    Tobacco Use    Smoking status: Never    Smokeless tobacco: Never   Vaping Use    Vaping status: Never Used   Substance and Sexual Activity    Alcohol use: No    Drug use: No    Sexual activity: Yes     Comment: current partner for 5 years           The following portions of the patient's history were reviewed and updated as appropriate: problem list, allergies, current medications, past medical history, past family history, past social history, and past surgical history.    Review of Systems    Immunization History   Administered Date(s) Administered    COVID-19 (PFIZER) Purple Cap Monovalent 03/11/2021, 04/08/2021, 10/31/2021    Fluzone (or Fluarix & Flulaval for VFC) >6mos 10/20/2023    Fluzone Quad >6mos (Multi-dose) 10/08/2018, 10/05/2019    Hepatitis A 01/01/1995    Hepatitis B Adult/Adolescent IM 02/14/2018, 03/13/2018, 09/10/2018    Influenza Injectable Mdck Pf Quad 12/21/2022    Influenza, Unspecified 12/21/2022    Pneumococcal Conjugate 20-Valent (PCV20) 04/17/2023    Pneumococcal Polysaccharide (PPSV23) 01/01/2012    Shingrix 05/20/2024    Tdap 02/01/2018       Objective   Vitals:    07/09/24 1643   BP: 140/90   Weight: 80.1 kg (176 lb 9.6 oz)   Height: 183 cm (72.05\")     Body mass index is 23.92 kg/m².  Physical Exam  Vitals reviewed.   Constitutional:       Appearance: He is well-developed.   HENT:      Head: Normocephalic and atraumatic.      Mouth/Throat:      Mouth: Mucous membranes are moist.      Pharynx: Oropharynx is clear.   Eyes:      Extraocular Movements: Extraocular movements intact.      Conjunctiva/sclera: Conjunctivae normal.      Pupils: Pupils are equal, round, and reactive to " light.   Neck:      Thyroid: No thyromegaly.      Vascular: No carotid bruit.   Cardiovascular:      Rate and Rhythm: Normal rate and regular rhythm.      Heart sounds: Normal heart sounds. No murmur heard.  Pulmonary:      Effort: Pulmonary effort is normal.      Breath sounds: Normal breath sounds.   Abdominal:      General: There is no distension.      Palpations: Abdomen is soft. There is no mass.      Tenderness: There is no abdominal tenderness. There is no rebound.   Musculoskeletal:      Cervical back: Neck supple.   Feet:      Right foot:      Protective Sensation: 5 sites tested.  5 sites sensed.      Skin integrity: Skin integrity normal.      Left foot:      Protective Sensation: 5 sites tested.  5 sites sensed.      Skin integrity: Skin integrity normal.   Lymphadenopathy:      Cervical: No cervical adenopathy.   Skin:     General: Skin is warm.   Neurological:      Mental Status: He is alert.   Psychiatric:         Behavior: Behavior normal.         Procedures    Assessment & Plan   Diagnoses and all orders for this visit:    1. Wellness examination (Primary)    2. Type 2 diabetes mellitus without complication, without long-term current use of insulin  -     SITagliptin (Januvia) 100 MG tablet; Take 1 tablet by mouth Daily.  Dispense: 90 tablet; Refill: 1  -     empagliflozin (Jardiance) 10 MG tablet tablet; Take 1 tablet by mouth Daily.  Dispense: 90 tablet; Refill: 1  -     Ambulatory Referral to Ophthalmology  -     Ambulatory Referral to Diabetic Education  -     Continuous Glucose Sensor (FreeStyle Felicia 3 Sensor) misc; Use 1 Device Every 14 (Fourteen) Days.  Dispense: 2 each; Refill: 5  -     Hemoglobin A1c; Future    3. High cholesterol  Comments:  Ran out of his lipitor  Orders:  -     atorvastatin (Lipitor) 40 MG tablet; Take 1 tablet by mouth Daily.  Dispense: 90 tablet; Refill: 1  -     Comprehensive Metabolic Panel; Future  -     Lipid Panel With / Chol / HDL Ratio; Future      Reviewed  labs. Discussed risks of untreated DM including blindness, kidney disease, heart disease and death. He is agreeable to taking above meds. Recc eye exam.     Discussed risk UTI and yeast with jardiance. Repeat BP in follow up and if it is still up then I will add an ARB.     Recc second shingrix. Cscope is UTD. Discussed exercising 150 min per week.             Return in about 3 months (around 10/9/2024), or 30 minutes, for Lab Before FUP.

## 2024-07-30 ENCOUNTER — TELEPHONE (OUTPATIENT)
Dept: INTERNAL MEDICINE | Facility: CLINIC | Age: 59
End: 2024-07-30
Payer: OTHER GOVERNMENT

## 2024-07-30 NOTE — TELEPHONE ENCOUNTER
Caller: Taiwo Bernal    Relationship: Self    Best call back number: [phone]     Which medication are you concerned about: Continuous Glucose Sensor (FreeStyle Felicia 3 Sensor) misc     Who prescribed you this medication: DR. INTERIANO      What are your concerns: [facility] SAYS THAT PATIENT NEEDS A PRIOR AUTHORIZATION ON FELICIA.  CAN THIS BE STARTED AND LET PATIENT KNOW THE STATUS?      [facility] [id number] [place] [facility] [address] [place] [address] [phone] [facility] [phone] FX

## 2024-08-02 NOTE — TELEPHONE ENCOUNTER
Unable to find patient with PA ; have to call plan. Spoke with pt and told him I will follow up Monday 8/5

## 2024-08-02 NOTE — TELEPHONE ENCOUNTER
Caller: Taiwo Bernal    Relationship: Self    Best call back number: [phone]     What form or medical record are you requesting: PRIOR AUTHORIZATION    Who is requesting this form or medical record from you: PHARMACY/ INSURANCE    Timeframe paperwork needed: ASAP    Additional notes: PATIENT STATED THAT HIS PHARMACY SAID THEY ARE STILL WAITING ON THE PRIOR AUTHORIZATION FOR THE FREESTYLE JULIO SENSOR.    PLEASE COMPLETE ASAP

## 2024-08-06 NOTE — TELEPHONE ENCOUNTER
Pt was informed that Freestyle Felicia will not be covered; per TB was ok will try to get samples

## 2024-08-19 DIAGNOSIS — E11.9 TYPE 2 DIABETES MELLITUS WITHOUT COMPLICATION, WITHOUT LONG-TERM CURRENT USE OF INSULIN: Chronic | ICD-10-CM

## 2024-08-19 RX ORDER — BLOOD-GLUCOSE SENSOR
1 EACH MISCELLANEOUS
Qty: 2 EACH | Refills: 5 | Status: SHIPPED | OUTPATIENT
Start: 2024-08-19

## 2024-09-12 ENCOUNTER — TELEPHONE (OUTPATIENT)
Dept: INTERNAL MEDICINE | Facility: CLINIC | Age: 59
End: 2024-09-12
Payer: OTHER GOVERNMENT

## 2024-09-12 NOTE — TELEPHONE ENCOUNTER
He needs to find out if insurance will cover farxiga instead of Januvia which they sent us a letter about

## 2024-09-16 ENCOUNTER — TELEPHONE (OUTPATIENT)
Dept: INTERNAL MEDICINE | Facility: CLINIC | Age: 59
End: 2024-09-16
Payer: OTHER GOVERNMENT

## 2024-10-14 ENCOUNTER — OFFICE VISIT (OUTPATIENT)
Dept: INTERNAL MEDICINE | Facility: CLINIC | Age: 59
End: 2024-10-14
Payer: OTHER GOVERNMENT

## 2024-10-14 VITALS
BODY MASS INDEX: 23.68 KG/M2 | SYSTOLIC BLOOD PRESSURE: 130 MMHG | HEIGHT: 72 IN | WEIGHT: 174.8 LBS | DIASTOLIC BLOOD PRESSURE: 72 MMHG

## 2024-10-14 DIAGNOSIS — E11.9 TYPE 2 DIABETES MELLITUS WITHOUT COMPLICATION, WITHOUT LONG-TERM CURRENT USE OF INSULIN: Primary | Chronic | ICD-10-CM

## 2024-10-14 DIAGNOSIS — E78.00 HIGH CHOLESTEROL: ICD-10-CM

## 2024-10-14 DIAGNOSIS — Z23 NEED FOR INFLUENZA VACCINATION: ICD-10-CM

## 2024-10-14 PROCEDURE — 90471 IMMUNIZATION ADMIN: CPT | Performed by: INTERNAL MEDICINE

## 2024-10-14 PROCEDURE — 90656 IIV3 VACC NO PRSV 0.5 ML IM: CPT | Performed by: INTERNAL MEDICINE

## 2024-10-14 PROCEDURE — 99214 OFFICE O/P EST MOD 30 MIN: CPT | Performed by: INTERNAL MEDICINE

## 2024-10-14 RX ORDER — DOXAZOSIN 8 MG/1
8 TABLET ORAL NIGHTLY
Qty: 90 TABLET | Refills: 2 | Status: SHIPPED | OUTPATIENT
Start: 2024-10-14

## 2024-10-14 RX ORDER — SILDENAFIL 100 MG/1
100 TABLET, FILM COATED ORAL DAILY PRN
COMMUNITY

## 2024-10-14 RX ORDER — ATORVASTATIN CALCIUM 40 MG/1
40 TABLET, FILM COATED ORAL DAILY
Qty: 90 TABLET | Refills: 1 | Status: SHIPPED | OUTPATIENT
Start: 2024-10-14

## 2024-10-14 RX ORDER — BLOOD-GLUCOSE SENSOR
1 EACH MISCELLANEOUS
Qty: 6 EACH | Refills: 2 | Status: SHIPPED | OUTPATIENT
Start: 2024-10-14

## 2024-10-14 NOTE — PROGRESS NOTES
Subjective        Chief Complaint   Patient presents with    Diabetes           Taiwo Beranl is a 59 y.o. male who presents for    Patient Active Problem List   Diagnosis    High cholesterol    Type 2 diabetes mellitus without complication, without long-term current use of insulin    Tubular adenoma       History of Present Illness       Denies chest pain, dyspnea, or abdominal pain. His 7 day avg glucose has been 260. The lowest sugar is 233.  Allergies   Allergen Reactions    Ace Inhibitors Cough       Current Outpatient Medications on File Prior to Visit   Medication Sig Dispense Refill    [DISCONTINUED] atorvastatin (Lipitor) 40 MG tablet Take 1 tablet by mouth Daily. 90 tablet 1    [DISCONTINUED] Continuous Glucose Sensor (FreeStyle Felicia 3 Sensor) misc Use 1 Device Every 14 (Fourteen) Days. 2 each 5    [DISCONTINUED] doxazosin (CARDURA) 8 MG tablet Take 1 tablet by mouth Every Night.      [DISCONTINUED] SITagliptin (Januvia) 100 MG tablet Take 1 tablet by mouth Daily. 90 tablet 1    sildenafil (VIAGRA) 100 MG tablet Take 1 tablet by mouth Daily As Needed for Erectile Dysfunction.      [DISCONTINUED] empagliflozin (Jardiance) 10 MG tablet tablet Take 1 tablet by mouth Daily. (Patient not taking: Reported on 10/14/2024) 90 tablet 1     No current facility-administered medications on file prior to visit.       Past Medical History:   Diagnosis Date    BPH (benign prostatic hyperplasia)        Past Surgical History:   Procedure Laterality Date    COLONOSCOPY  03/27/2018    repeat in 5 years with Dr. Sexton    COLONOSCOPY  01/12/2024    Dr. Sexton       Family History   Problem Relation Age of Onset    Breast cancer Mother     Diabetes Father     Lung cancer Father     Colon polyps Brother        Social History     Socioeconomic History    Marital status:    Tobacco Use    Smoking status: Never    Smokeless tobacco: Never   Vaping Use    Vaping status: Never Used   Substance and Sexual Activity    Alcohol  "use: No    Drug use: No    Sexual activity: Yes     Comment: current partner for 5 years           The following portions of the patient's history were reviewed and updated as appropriate: problem list, allergies, current medications, past medical history, past family history, past social history, and past surgical history.    Review of Systems    Immunization History   Administered Date(s) Administered    COVID-19 (PFIZER) Purple Cap Monovalent 03/11/2021, 04/08/2021, 10/31/2021    Fluzone  >6mos 10/14/2024    Fluzone (or Fluarix & Flulaval for VFC) >6mos 10/20/2023    Fluzone Quad >6mos (Multi-dose) 10/08/2018, 10/05/2019    Hepatitis A 01/01/1995    Hepatitis B Adult/Adolescent IM 02/14/2018, 03/13/2018, 09/10/2018    Influenza Injectable Mdck Pf Quad 12/21/2022    Influenza, Unspecified 12/21/2022    Pneumococcal Conjugate 20-Valent (PCV20) 04/17/2023    Pneumococcal Polysaccharide (PPSV23) 01/01/2012    Shingrix 05/20/2024, 07/26/2024    Tdap 02/01/2018       Objective   Vitals:    10/14/24 1341   BP: 130/72   Weight: 79.3 kg (174 lb 12.8 oz)   Height: 183 cm (72.05\")     Body mass index is 23.68 kg/m².  Physical Exam  Vitals reviewed.   Constitutional:       Appearance: He is well-developed.   HENT:      Head: Normocephalic and atraumatic.   Cardiovascular:      Rate and Rhythm: Normal rate and regular rhythm.      Heart sounds: Normal heart sounds, S1 normal and S2 normal.   Pulmonary:      Effort: Pulmonary effort is normal.      Breath sounds: Normal breath sounds.   Skin:     General: Skin is warm.   Neurological:      Mental Status: He is alert.   Psychiatric:         Behavior: Behavior normal.         Procedures    Assessment & Plan   Diagnoses and all orders for this visit:    1. Type 2 diabetes mellitus without complication, without long-term current use of insulin (Primary)  -     metFORMIN (GLUCOPHAGE) 500 MG tablet; Take 1 tablet by mouth 2 (Two) Times a Day With Meals.  Dispense: 180 tablet; " Refill: 1  -     SITagliptin (Januvia) 100 MG tablet; Take 1 tablet by mouth Daily.  Dispense: 90 tablet; Refill: 1  -     Continuous Glucose Sensor (FreeStyle Felicia 3 Sensor) misc; Use 1 Device Every 14 (Fourteen) Days.  Dispense: 6 each; Refill: 2    2. High cholesterol  Comments:  LDL at goal with Lipitor.  Orders:  -     atorvastatin (Lipitor) 40 MG tablet; Take 1 tablet by mouth Daily.  Dispense: 90 tablet; Refill: 1    3. Need for influenza vaccination  -     Fluzone >6mos    Other orders  -     doxazosin (CARDURA) 8 MG tablet; Take 1 tablet by mouth Every Night.  Dispense: 90 tablet; Refill: 2               Reviewed cmp and A1c. He is seeing diabetes ed at end of Nov at [facility]. He has an eye exam set up for 2 weeks. I sent in metformin which he is willing to try. Send in januvia as well; he will let me know if any issue filling.    Return in about 3 months (around 1/14/2025).

## 2024-12-05 DIAGNOSIS — E78.00 HIGH CHOLESTEROL: ICD-10-CM

## 2024-12-05 RX ORDER — ATORVASTATIN CALCIUM 40 MG/1
40 TABLET, FILM COATED ORAL DAILY
Qty: 90 TABLET | Refills: 1 | Status: SHIPPED | OUTPATIENT
Start: 2024-12-05